# Patient Record
Sex: FEMALE | Race: WHITE | NOT HISPANIC OR LATINO | URBAN - METROPOLITAN AREA
[De-identification: names, ages, dates, MRNs, and addresses within clinical notes are randomized per-mention and may not be internally consistent; named-entity substitution may affect disease eponyms.]

---

## 2017-01-24 ENCOUNTER — OUTPATIENT (OUTPATIENT)
Dept: OUTPATIENT SERVICES | Facility: HOSPITAL | Age: 57
LOS: 1 days | End: 2017-01-24

## 2017-01-25 ENCOUNTER — OUTPATIENT (OUTPATIENT)
Dept: OUTPATIENT SERVICES | Facility: HOSPITAL | Age: 57
LOS: 1 days | End: 2017-01-25

## 2020-07-06 ENCOUNTER — APPOINTMENT (OUTPATIENT)
Dept: DISASTER EMERGENCY | Facility: CLINIC | Age: 60
End: 2020-07-06

## 2020-07-06 DIAGNOSIS — Z01.818 ENCOUNTER FOR OTHER PREPROCEDURAL EXAMINATION: ICD-10-CM

## 2020-07-06 PROBLEM — Z00.00 ENCOUNTER FOR PREVENTIVE HEALTH EXAMINATION: Status: ACTIVE | Noted: 2020-07-06

## 2020-07-07 LAB — SARS-COV-2 N GENE NPH QL NAA+PROBE: NOT DETECTED

## 2021-04-30 ENCOUNTER — OUTPATIENT (OUTPATIENT)
Dept: OUTPATIENT SERVICES | Facility: HOSPITAL | Age: 61
LOS: 1 days | End: 2021-04-30
Payer: COMMERCIAL

## 2021-04-30 PROCEDURE — 93010 ELECTROCARDIOGRAM REPORT: CPT

## 2021-05-11 ENCOUNTER — APPOINTMENT (OUTPATIENT)
Dept: DISASTER EMERGENCY | Facility: CLINIC | Age: 61
End: 2021-05-11

## 2021-05-12 LAB — SARS-COV-2 N GENE NPH QL NAA+PROBE: NOT DETECTED

## 2021-05-14 ENCOUNTER — OUTPATIENT (OUTPATIENT)
Dept: OUTPATIENT SERVICES | Facility: HOSPITAL | Age: 61
LOS: 1 days | End: 2021-05-14

## 2023-07-17 ENCOUNTER — OFFICE (OUTPATIENT)
Dept: URBAN - METROPOLITAN AREA CLINIC 38 | Facility: CLINIC | Age: 63
Setting detail: OPHTHALMOLOGY
End: 2023-07-17
Payer: COMMERCIAL

## 2023-07-17 DIAGNOSIS — H25.13: ICD-10-CM

## 2023-07-17 DIAGNOSIS — H35.413: ICD-10-CM

## 2023-07-17 DIAGNOSIS — H43.813: ICD-10-CM

## 2023-07-17 DIAGNOSIS — H02.831: ICD-10-CM

## 2023-07-17 DIAGNOSIS — H52.4: ICD-10-CM

## 2023-07-17 DIAGNOSIS — H02.834: ICD-10-CM

## 2023-07-17 DIAGNOSIS — Z79.899: ICD-10-CM

## 2023-07-17 PROBLEM — H52.7 REFRACTIVE ERROR: Status: ACTIVE | Noted: 2023-07-17

## 2023-07-17 PROCEDURE — 92083 EXTENDED VISUAL FIELD XM: CPT

## 2023-07-17 PROCEDURE — 92134 CPTRZ OPH DX IMG PST SGM RTA: CPT

## 2023-07-17 PROCEDURE — 92014 COMPRE OPH EXAM EST PT 1/>: CPT

## 2023-07-17 PROCEDURE — 92015 DETERMINE REFRACTIVE STATE: CPT

## 2023-07-17 ASSESSMENT — CONFRONTATIONAL VISUAL FIELD TEST (CVF)
OS_FINDINGS: FULL
OD_FINDINGS: FULL

## 2023-07-17 ASSESSMENT — REFRACTION_AUTOREFRACTION
OD_AXIS: 057
OD_CYLINDER: -1.00
OS_AXIS: 140
OS_CYLINDER: -0.25
OD_SPHERE: +1.00
OS_SPHERE: +0.75

## 2023-07-17 ASSESSMENT — REFRACTION_MANIFEST
OS_VA2: 20/20(J1+)
OS_VA1: 20/25+
OD_AXIS: 060
OD_VA1: 20/20
OD_VA1: 20/20
OD_ADD: +2.50
OD_ADD: +2.50
OS_AXIS: 140
OD_VA2: 20/20(J1+)
OS_VA1: 20/25+
OS_VA2: 20/20(J1+)
OS_ADD: +2.50
OS_AXIS: 140
OU_VA: 20/20
OS_CYLINDER: -0.75
OD_SPHERE: +0.75
OS_SPHERE: +0.25
OD_VA2: 20/20(J1+)
OS_SPHERE: +0.25
OU_VA: 20/20
OS_CYLINDER: -0.75
OD_SPHERE: +0.75
OD_CYLINDER: -0.50
OD_CYLINDER: -0.50
OD_AXIS: 060
OS_ADD: +2.50

## 2023-07-17 ASSESSMENT — KERATOMETRY
OD_AXISANGLE_DEGREES: 145
OS_K2POWER_DIOPTERS: 42.25
OS_AXISANGLE_DEGREES: 075
OD_K2POWER_DIOPTERS: 41.75
OS_K1POWER_DIOPTERS: 41.50
OD_K1POWER_DIOPTERS: 41.00
METHOD_AUTO_MANUAL: AUTO

## 2023-07-17 ASSESSMENT — REFRACTION_CURRENTRX
OD_OVR_VA: 20/
OD_VPRISM_DIRECTION: SV
OS_VPRISM_DIRECTION: SV
OS_SPHERE: +2.75
OD_OVR_VA: 20/
OD_VPRISM_DIRECTION: SV
OD_SPHERE: +0.75
OD_CYLINDER: -0.50
OD_AXIS: 059
OD_SPHERE: +3.00
OD_CYLINDER: -0.50
OS_SPHERE: +0.50
OS_OVR_VA: 20/
OD_AXIS: 063
OS_CYLINDER: SPH
OS_OVR_VA: 20/
OS_CYLINDER: SPHERE
OS_VPRISM_DIRECTION: SV

## 2023-07-17 ASSESSMENT — TONOMETRY
OS_IOP_MMHG: 20
OD_IOP_MMHG: 17

## 2023-07-17 ASSESSMENT — SPHEQUIV_DERIVED
OS_SPHEQUIV: -0.125
OS_SPHEQUIV: 0.625
OD_SPHEQUIV: 0.5
OS_SPHEQUIV: -0.125
OD_SPHEQUIV: 0.5
OD_SPHEQUIV: 0.5

## 2023-07-17 ASSESSMENT — AXIALLENGTH_DERIVED
OD_AL: 24.1925
OD_AL: 24.1925
OS_AL: 24.2556
OS_AL: 24.2556
OD_AL: 24.1925
OS_AL: 23.9507

## 2023-07-17 ASSESSMENT — VISUAL ACUITY
OD_BCVA: 20/25
OS_BCVA: 20/25+3

## 2023-07-17 ASSESSMENT — LID POSITION - DERMATOCHALASIS
OS_DERMATOCHALASIS: LUL 2+
OD_DERMATOCHALASIS: RUL 2+

## 2023-10-17 PROBLEM — Z87.19 HISTORY OF GASTROESOPHAGEAL REFLUX (GERD): Status: RESOLVED | Noted: 2023-10-17 | Resolved: 2023-10-17

## 2023-10-17 PROBLEM — Z87.19 HISTORY OF HEMORRHOIDS: Status: RESOLVED | Noted: 2023-10-17 | Resolved: 2023-10-17

## 2023-10-17 PROBLEM — Z87.09 HISTORY OF ASTHMA: Status: RESOLVED | Noted: 2023-10-17 | Resolved: 2023-10-17

## 2023-10-17 PROBLEM — Z87.39 HISTORY OF OSTEOPENIA: Status: RESOLVED | Noted: 2023-10-17 | Resolved: 2023-10-17

## 2023-10-17 PROBLEM — G43.909 MIGRAINES: Status: RESOLVED | Noted: 2023-10-17 | Resolved: 2023-10-17

## 2023-10-17 PROBLEM — Z86.2 HISTORY OF ANEMIA: Status: RESOLVED | Noted: 2023-10-17 | Resolved: 2023-10-17

## 2023-10-17 RX ORDER — FAMOTIDINE 40 MG/1
40 TABLET, FILM COATED ORAL
Refills: 0 | Status: ACTIVE | COMMUNITY

## 2023-10-17 RX ORDER — PANTOPRAZOLE 40 MG/1
40 TABLET, DELAYED RELEASE ORAL
Refills: 0 | Status: ACTIVE | COMMUNITY

## 2023-10-17 RX ORDER — CYANOCOBALAMIN 1000 UG/ML
1000 INJECTION INTRAMUSCULAR; SUBCUTANEOUS
Refills: 0 | Status: ACTIVE | COMMUNITY

## 2023-10-17 RX ORDER — HYDROXYZINE HYDROCHLORIDE 25 MG/1
25 TABLET ORAL
Refills: 0 | Status: ACTIVE | COMMUNITY

## 2023-10-17 RX ORDER — CHROMIUM 200 MCG
800 TABLET ORAL
Refills: 0 | Status: ACTIVE | COMMUNITY

## 2023-10-20 ENCOUNTER — APPOINTMENT (OUTPATIENT)
Dept: THORACIC SURGERY | Facility: CLINIC | Age: 63
End: 2023-10-20
Payer: MEDICARE

## 2023-10-20 VITALS
WEIGHT: 205 LBS | OXYGEN SATURATION: 97 % | HEIGHT: 64 IN | SYSTOLIC BLOOD PRESSURE: 142 MMHG | BODY MASS INDEX: 35 KG/M2 | DIASTOLIC BLOOD PRESSURE: 90 MMHG | HEART RATE: 96 BPM | TEMPERATURE: 98 F

## 2023-10-20 DIAGNOSIS — Z87.09 PERSONAL HISTORY OF OTHER DISEASES OF THE RESPIRATORY SYSTEM: ICD-10-CM

## 2023-10-20 DIAGNOSIS — G43.909 MIGRAINE, UNSPECIFIED, NOT INTRACTABLE, W/OUT STATUS MIGRAINOSUS: ICD-10-CM

## 2023-10-20 DIAGNOSIS — Z86.2 PERSONAL HISTORY OF DISEASES OF THE BLOOD AND BLOOD-FORMING ORGANS AND CERTAIN DISORDERS INVOLVING THE IMMUNE MECHANISM: ICD-10-CM

## 2023-10-20 DIAGNOSIS — Z87.39 PERSONAL HISTORY OF OTHER DISEASES OF THE MUSCULOSKELETAL SYSTEM AND CONNECTIVE TISSUE: ICD-10-CM

## 2023-10-20 DIAGNOSIS — Z87.19 PERSONAL HISTORY OF OTHER DISEASES OF THE DIGESTIVE SYSTEM: ICD-10-CM

## 2023-10-20 PROCEDURE — 99202 OFFICE O/P NEW SF 15 MIN: CPT

## 2024-01-17 ENCOUNTER — APPOINTMENT (OUTPATIENT)
Dept: ORTHOPEDIC SURGERY | Facility: CLINIC | Age: 64
End: 2024-01-17
Payer: MEDICARE

## 2024-01-17 VITALS — HEIGHT: 65 IN | WEIGHT: 194 LBS | BODY MASS INDEX: 32.32 KG/M2

## 2024-01-17 PROCEDURE — 99204 OFFICE O/P NEW MOD 45 MIN: CPT

## 2024-01-17 RX ORDER — NAPROXEN 500 MG/1
500 TABLET ORAL
Refills: 0 | Status: ACTIVE | COMMUNITY

## 2024-01-17 RX ORDER — FLUTICASONE PROPIONATE 50 MCG
50 SPRAY, SUSPENSION NASAL
Refills: 0 | Status: ACTIVE | COMMUNITY

## 2024-01-17 NOTE — PHYSICAL EXAM
[TextEntry] : Constitutional: Well groomed and developed.  Respiratory: Normal, unlabored breathing. No use of accessory muscles.  Skin: No rashes or ulcers. Skin warm and dry.  Psychiatric: Oriented to time, place, person and event. No acute distress. Gait: Heel to toe Patient able to walk on toes and heels.    Lumbar spine:  Posture: Normal on coronal and sagittal alignment  AROM:  Flexion 70 Extension 10  Moderate pain with truncal rotation  Tenderness:  Thoracic: Moderate tenderness on palpation     TTP over T8 and T11 Lumbar: No tenderness on palpation  Sacrum/coccyx: no tenderness on palpation  Greater trochanteric bursa:  No tenderness  PSIS: None    Motor:                         R             L LE:                                IS                    5             5 Quad              5             5 TA                  5             5 EHL                5             5 Gastroc         5             5                 R  L DTR: Patella  2+  2+ Achilles  2+  2+  Sensory: Light touch sensation intact T12-S1  Babinski's Sign: Negative bilaterally  Straight leg raise test: Negative bilaterally  NEGIN test: negative bilaterally

## 2024-01-17 NOTE — IMAGING
[Outside films reviewed] : Outside films reviewed [Fracture] : Fracture [FreeTextEntry1] : Fracture T8, T11

## 2024-01-17 NOTE — ASSESSMENT
[FreeTextEntry1] : 64 yo female presents today for evaluation of her mid back pain. X-rays from  reveal compression fracture T8, T11, likely acute. Discussed with patient that MRI is indicated to further evaluate for any retropulsion.   - Patient given prescription for MRI, follow up after study is completed to discuss results.   - Recommend NSAIDs PRN - Recommend heating pad use to decrease muscle spasm - Discussed the importance of home exercises, including but not limited to hamstring stretching and core strengthening   Patient was educated on their diagnosis today. All questions answered and patient expressed understanding.   F/U after MRI

## 2024-01-17 NOTE — HISTORY OF PRESENT ILLNESS
[Mid-back] : mid-back [Lower back] : lower back [Gradual] : gradual [5] : 5 [3] : 3 [Dull/Aching] : dull/aching [Sharp] : sharp [Constant] : constant [Standing] : standing [Walking] : walking [Exercising] : exercising [Retired] : Work status: retired [de-identified] : Patient presents today with mid and low back pain after slipping in the mud 11/2023- reports T spine fx.  Pt states pain increased over time- went to PCP in 1/2024 and had Xrays at P  Pt has hx of broken ribs on Right side 9/2023 Pt reports constant dull pain in mid back- worse with sharp pains in right side when laying down/ sleeping  Pt denies pain, numbness, or tingling in LEs but reports consistent numbness in great toe  Pt reports taking Naproxen 500mg and using cream and salon Pas per PCP with relief.  Pt denies previous treatments for her back in the past,  [] : no [FreeTextEntry3] : 11/2023- fell at home  [de-identified] : sleeping  [de-identified] : Xrays at  1/2024

## 2024-01-18 ENCOUNTER — RESULT REVIEW (OUTPATIENT)
Age: 64
End: 2024-01-18

## 2024-01-22 ENCOUNTER — APPOINTMENT (OUTPATIENT)
Dept: ORTHOPEDIC SURGERY | Facility: CLINIC | Age: 64
End: 2024-01-22
Payer: MEDICARE

## 2024-01-22 PROCEDURE — 99214 OFFICE O/P EST MOD 30 MIN: CPT

## 2024-01-22 NOTE — HISTORY OF PRESENT ILLNESS
[Lower back] : lower back [Mid-back] : mid-back [Gradual] : gradual [5] : 5 [3] : 3 [Dull/Aching] : dull/aching [Sharp] : sharp [Constant] : constant [Standing] : standing [Walking] : walking [Exercising] : exercising [Retired] : Work status: retired [de-identified] : Follow up thoracic spine MRI Results at . Patient states she has constant pain in her lower back.  Patient admits to taking Naproxen for pain. Patient admits to using Salonpas.  Today's Pain 3/10 [] : no [FreeTextEntry3] : 11/2023- fell at home  [de-identified] : sleeping  [de-identified] : Xrays at  1/2024

## 2024-01-22 NOTE — DATA REVIEWED
[MRI] : MRI [Thoracic Spine] : thoracic spine [I independently reviewed and interpreted images and report] : I independently reviewed and interpreted images and report [FreeTextEntry1] : 1/2024 MRI of L-Spine was reviewed today and is as follows:  Acute compression deformity T8, T11 No retropulsion

## 2024-01-22 NOTE — ASSESSMENT
[FreeTextEntry1] : 1/2024 MRI of L-Spine was reviewed today and is as follows:  Acute compression deformity T8, T11 No retropulsion  62 yo female presents today for evaluation of her mid back pain. MRI detailed above shows acute compression deformity without retropulsion. Patient TTP over fractures but states her pain is manageable. Recommend bracing, activity modification. Discussed with patient that fracture generally heals in 6-8 weeks on its own.   - Patient given rx for TLSO brace to provide stability and facilitate healing following injury to the thoracic/ lumbar spine.   - Emphasized importance of proper lifting mechanics  - Recommend NSAIDs PRN - Recommend heating pad use to decrease muscle spasm - Discussed the importance of home exercises, including but not limited to hamstring stretching and core strengthening   Patient was educated on their diagnosis today. All questions answered and patient expressed understanding.   Follow up in 6 weeks for repeat x-ray

## 2024-02-02 RX ORDER — METHYLPREDNISOLONE 4 MG/1
4 TABLET ORAL
Qty: 1 | Refills: 0 | Status: ACTIVE | COMMUNITY
Start: 2024-02-02 | End: 1900-01-01

## 2024-03-04 ENCOUNTER — APPOINTMENT (OUTPATIENT)
Dept: ORTHOPEDIC SURGERY | Facility: CLINIC | Age: 64
End: 2024-03-04
Payer: MEDICARE

## 2024-03-04 PROCEDURE — 99214 OFFICE O/P EST MOD 30 MIN: CPT

## 2024-03-04 PROCEDURE — 72070 X-RAY EXAM THORAC SPINE 2VWS: CPT

## 2024-03-04 NOTE — PHYSICAL EXAM
[TextEntry] : Constitutional: Well groomed and developed.  Respiratory: Normal, unlabored breathing. No use of accessory muscles.  Skin: No rashes or ulcers. Skin warm and dry.  Psychiatric: Oriented to time, place, person and event. No acute distress. Gait: Heel to toe Patient able to walk on toes and heels.    Lumbar spine:  Posture: Normal on coronal and sagittal alignment  AROM:  Flexion 70 Extension 10  Moderate pain with truncal rotation  Tenderness:  Thoracic: Moderate tenderness on palpation     TTP over T8, mild TTP over T11    TTP thoracic paraspinal muscles Lumbar: No tenderness on palpation  Sacrum/coccyx: no tenderness on palpation  Greater trochanteric bursa:  No tenderness  PSIS: None    Motor:                         R             L LE:                                IS                    5             5 Quad              5             5 TA                  5             5 EHL                5             5 Gastroc         5             5                 R  L DTR: Patella  2+  2+ Achilles  2+  2+  Sensory: Light touch sensation intact T12-S1  Babinski's Sign: Negative bilaterally  Straight leg raise test: Negative bilaterally  NEGIN test: negative bilaterally

## 2024-03-04 NOTE — HISTORY OF PRESENT ILLNESS
[Mid-back] : mid-back [Lower back] : lower back [Gradual] : gradual [5] : 5 [3] : 3 [Dull/Aching] : dull/aching [Sharp] : sharp [Constant] : constant [Standing] : standing [Walking] : walking [Exercising] : exercising [Retired] : Work status: retired [de-identified] : Follow up thoracic spine compression fracture T8 and T11. Patient states she has pain in her middle back when lying down. Patient states she still has constant pain in her lower back. Patient admits to taking Gabapentin and Naproxen for pain. Patient admits to wearing TLSO brace with prolonged standing.  Today's Pain 4/10, lying down 9/10 [] : no [FreeTextEntry3] : 11/2023- fell at home  [de-identified] : sleeping  [de-identified] : Xrays at  1/2024

## 2024-03-04 NOTE — ASSESSMENT
[FreeTextEntry1] : 1/2024 MRI of L-Spine was reviewed today and is as follows:  Acute compression deformity T8, T11 No retropulsion  62 yo female presents today for evaluation of her mid back pain. X-rays today show compression fractures stable. However, patient still with TTP primarily over T8 despite 2 months of TLSO bracing. Discussed options with patient including kyphoplasty and bracing. Patient has elected to proceed with 1 more month of bracing.  - Continue TLSO brace to provide stability and facilitate healing following injury to the thoracic/ lumbar spine.   - Emphasized importance of proper lifting mechanics  - Recommend NSAIDs PRN - Recommend heating pad use to decrease muscle spasm - Discussed the importance of home exercises, including but not limited to hamstring stretching and core strengthening   Patient was educated on their diagnosis today. All questions answered and patient expressed understanding.   Follow up in 2 months

## 2024-05-06 ENCOUNTER — APPOINTMENT (OUTPATIENT)
Dept: ORTHOPEDIC SURGERY | Facility: CLINIC | Age: 64
End: 2024-05-06
Payer: MEDICARE

## 2024-05-06 DIAGNOSIS — S22.080A WEDGE COMPRESSION FRACTURE OF T11-T12 VERTEBRA, INITIAL ENCOUNTER FOR CLOSED FRACTURE: ICD-10-CM

## 2024-05-06 DIAGNOSIS — S22.060A WEDGE COMPRESSION FRACTURE OF T7-T8 VERTEBRA, INITIAL ENCOUNTER FOR CLOSED FRACTURE: ICD-10-CM

## 2024-05-06 PROCEDURE — 99214 OFFICE O/P EST MOD 30 MIN: CPT

## 2024-05-06 NOTE — HISTORY OF PRESENT ILLNESS
[de-identified] : Follow up thoracic spine compression fracture T8 and T11 from 11/2023. Patient states she has pain wrapping into her ribcage. Patient admits to taking Gabapentin. Patient admits to wearing TLSO brace with prolonged standing.  Today's Pain 1/10

## 2024-05-06 NOTE — PHYSICAL EXAM
[TextEntry] : Constitutional: Well groomed and developed.  Respiratory: Normal, unlabored breathing. No use of accessory muscles.  Skin: No rashes or ulcers. Skin warm and dry.  Psychiatric: Oriented to time, place, person and event. No acute distress. Gait: Heel to toe Patient able to walk on toes and heels.    Lumbar spine:  Posture: Normal on coronal and sagittal alignment  AROM:  Flexion 90 Extension 10  Mild pain with truncal rotation  Tenderness:  Thoracic: Mild tenderness on palpation  Lumbar: No tenderness on palpation  Sacrum/coccyx: no tenderness on palpation  Greater trochanteric bursa:  No tenderness  PSIS: None    Motor:                         R             L LE:                                IS                    5             5 Quad              5             5 TA                  5             5 EHL                5             5 Gastroc         5             5                 R  L DTR: Patella  2+  2+ Achilles  2+  2+  Sensory: Light touch sensation intact T12-S1  Babinski's Sign: Negative bilaterally  Straight leg raise test: Negative bilaterally  NEGIN test: negative bilaterally

## 2024-05-06 NOTE — ASSESSMENT
[FreeTextEntry1] : 1/2024 MRI of L-Spine was reviewed today and is as follows:  Acute compression deformity T8, T11 No retropulsion  64 yo female presents today for evaluation of her mid back pain. Patient with some residual pain and muscle spasm following compression fractures in November. However, pain is improved and no TTP over fracture sites. I recommend proceeding with PT for relief.   - Recommend physical therapy to regain range of motion, strengthening and symptomatic improvement. Prescription given in office today.   - Recommend NSAIDs PRN - Recommend heating pad use to decrease muscle spasm - Discussed the importance of home exercises, including but not limited to hamstring stretching and core strengthening   Patient was educated on their diagnosis today. All questions answered and patient expressed understanding.   Follow up in 2 months

## 2024-12-18 PROBLEM — H11.153 PINGUECULA; BOTH EYES: Status: ACTIVE | Noted: 2024-12-18

## 2025-01-28 ENCOUNTER — RESULT REVIEW (OUTPATIENT)
Age: 65
End: 2025-01-28

## 2025-02-06 ENCOUNTER — OFFICE (OUTPATIENT)
Dept: URBAN - METROPOLITAN AREA CLINIC 38 | Facility: CLINIC | Age: 65
Setting detail: OPHTHALMOLOGY
End: 2025-02-06
Payer: MEDICARE

## 2025-02-06 DIAGNOSIS — H52.4: ICD-10-CM

## 2025-02-06 DIAGNOSIS — H25.13: ICD-10-CM

## 2025-02-06 DIAGNOSIS — H43.813: ICD-10-CM

## 2025-02-06 DIAGNOSIS — H35.413: ICD-10-CM

## 2025-02-06 DIAGNOSIS — H02.831: ICD-10-CM

## 2025-02-06 PROCEDURE — 92015 DETERMINE REFRACTIVE STATE: CPT | Performed by: OPTOMETRIST

## 2025-02-06 PROCEDURE — 92014 COMPRE OPH EXAM EST PT 1/>: CPT | Performed by: OPTOMETRIST

## 2025-02-06 ASSESSMENT — REFRACTION_MANIFEST
OD_CYLINDER: -0.50
OD_CYLINDER: -0.50
OS_VA1: 20/30
OD_VA1: 20/30+
OS_SPHERE: +0.50
OD_VA1: 20/30+
OD_AXIS: 055
OD_ADD: +2.50
OD_AXIS: 055
OD_SPHERE: +0.75
OS_CYLINDER: -0.75
OS_CYLINDER: -0.50
OS_VA1: 20/30
OD_SPHERE: +0.50
OS_AXIS: 155
OS_ADD: +2.50
OS_SPHERE: +0.75
OS_AXIS: 145

## 2025-02-06 ASSESSMENT — REFRACTION_CURRENTRX
OD_AXIS: 059
OD_SPHERE: +0.75
OD_OVR_VA: 20/
OD_VPRISM_DIRECTION: SV
OS_VPRISM_DIRECTION: SV
OS_OVR_VA: 20/
OD_CYLINDER: -0.50
OS_OVR_VA: 20/
OD_OVR_VA: 20/
OS_SPHERE: +0.50
OD_SPHERE: +3.00
OD_AXIS: 063
OS_SPHERE: +2.75
OS_CYLINDER: SPH
OD_CYLINDER: -0.50
OS_CYLINDER: SPHERE
OD_VPRISM_DIRECTION: SV
OS_VPRISM_DIRECTION: SV

## 2025-02-06 ASSESSMENT — KERATOMETRY
OD_K2POWER_DIOPTERS: 41.75
OS_AXISANGLE_DEGREES: 064
OS_K1POWER_DIOPTERS: 41.50
METHOD_AUTO_MANUAL: AUTO
OD_AXISANGLE_DEGREES: 138
OD_K1POWER_DIOPTERS: 41.00
OS_K2POWER_DIOPTERS: 41.75

## 2025-02-06 ASSESSMENT — REFRACTION_AUTOREFRACTION
OD_SPHERE: +0.75
OS_SPHERE: +1.00
OS_CYLINDER: -0.50
OS_AXIS: 139
OD_CYLINDER: -0.75
OD_AXIS: 058

## 2025-02-06 ASSESSMENT — LID POSITION - DERMATOCHALASIS
OD_DERMATOCHALASIS: RUL 2+
OS_DERMATOCHALASIS: LUL 2+

## 2025-02-06 ASSESSMENT — CONFRONTATIONAL VISUAL FIELD TEST (CVF)
OD_FINDINGS: FULL
OS_FINDINGS: FULL

## 2025-02-06 ASSESSMENT — VISUAL ACUITY
OD_BCVA: 20/25
OS_BCVA: 20/25

## 2025-02-27 PROBLEM — H11.153 PINGUECULA; BOTH EYES: Status: ACTIVE | Noted: 2025-02-07

## 2025-03-17 ENCOUNTER — APPOINTMENT (OUTPATIENT)
Dept: ORTHOPEDIC SURGERY | Facility: CLINIC | Age: 65
End: 2025-03-17
Payer: MEDICARE

## 2025-03-17 DIAGNOSIS — S32.010A WEDGE COMPRESSION FRACTURE OF FIRST LUMBAR VERTEBRA, INITIAL ENCOUNTER FOR CLOSED FRACTURE: ICD-10-CM

## 2025-03-17 DIAGNOSIS — S22.080A WEDGE COMPRESSION FRACTURE OF T11-T12 VERTEBRA, INITIAL ENCOUNTER FOR CLOSED FRACTURE: ICD-10-CM

## 2025-03-17 PROCEDURE — 99214 OFFICE O/P EST MOD 30 MIN: CPT

## 2025-03-17 PROCEDURE — 72100 X-RAY EXAM L-S SPINE 2/3 VWS: CPT

## 2025-03-17 PROCEDURE — 72074 X-RAY EXAM THORAC SPINE4/>VW: CPT

## 2025-03-17 RX ORDER — LEVOCETIRIZINE DIHYDROCHLORIDE 5 MG/1
5 TABLET ORAL
Refills: 0 | Status: ACTIVE | COMMUNITY

## 2025-03-24 ENCOUNTER — RESULT REVIEW (OUTPATIENT)
Age: 65
End: 2025-03-24

## 2025-04-28 ENCOUNTER — APPOINTMENT (OUTPATIENT)
Dept: ORTHOPEDIC SURGERY | Facility: CLINIC | Age: 65
End: 2025-04-28
Payer: MEDICARE

## 2025-04-28 DIAGNOSIS — M48.061 SPINAL STENOSIS, LUMBAR REGION WITHOUT NEUROGENIC CLAUDICATION: ICD-10-CM

## 2025-04-28 DIAGNOSIS — S22.080A WEDGE COMPRESSION FRACTURE OF T11-T12 VERTEBRA, INITIAL ENCOUNTER FOR CLOSED FRACTURE: ICD-10-CM

## 2025-04-28 DIAGNOSIS — S22.060A WEDGE COMPRESSION FRACTURE OF T7-T8 VERTEBRA, INITIAL ENCOUNTER FOR CLOSED FRACTURE: ICD-10-CM

## 2025-04-28 DIAGNOSIS — S32.010A WEDGE COMPRESSION FRACTURE OF FIRST LUMBAR VERTEBRA, INITIAL ENCOUNTER FOR CLOSED FRACTURE: ICD-10-CM

## 2025-04-28 PROCEDURE — 99214 OFFICE O/P EST MOD 30 MIN: CPT

## 2025-04-29 ENCOUNTER — OFFICE (OUTPATIENT)
Dept: URBAN - METROPOLITAN AREA CLINIC 88 | Facility: CLINIC | Age: 65
Setting detail: OPHTHALMOLOGY
End: 2025-04-29
Payer: MEDICARE

## 2025-04-29 ENCOUNTER — OFFICE (OUTPATIENT)
Dept: URBAN - METROPOLITAN AREA CLINIC 105 | Facility: CLINIC | Age: 65
Setting detail: OPHTHALMOLOGY
End: 2025-04-29
Payer: MEDICARE

## 2025-04-29 DIAGNOSIS — H35.413: ICD-10-CM

## 2025-04-29 DIAGNOSIS — H33.8: ICD-10-CM

## 2025-04-29 DIAGNOSIS — H33.311: ICD-10-CM

## 2025-04-29 DIAGNOSIS — H43.813: ICD-10-CM

## 2025-04-29 PROCEDURE — N/C NO CHARGE: Performed by: OPTOMETRIST

## 2025-04-29 PROCEDURE — 99215 OFFICE O/P EST HI 40 MIN: CPT | Performed by: OPHTHALMOLOGY

## 2025-04-29 PROCEDURE — 92134 CPTRZ OPH DX IMG PST SGM RTA: CPT | Performed by: OPHTHALMOLOGY

## 2025-04-29 PROCEDURE — 92201 OPSCPY EXTND RTA DRAW UNI/BI: CPT | Performed by: OPHTHALMOLOGY

## 2025-04-29 ASSESSMENT — REFRACTION_AUTOREFRACTION
OS_CYLINDER: -0.50
OS_AXIS: 139
OD_CYLINDER: -0.75
OD_SPHERE: +0.75
OS_SPHERE: +1.00
OS_AXIS: 139
OS_CYLINDER: -0.50
OD_SPHERE: +0.75
OS_SPHERE: +1.00
OD_AXIS: 058
OD_CYLINDER: -0.75
OD_AXIS: 058

## 2025-04-29 ASSESSMENT — REFRACTION_MANIFEST
OD_ADD: +2.50
OS_SPHERE: +0.75
OD_CYLINDER: -0.50
OS_AXIS: 145
OS_ADD: +2.50
OD_VA1: 20/30+
OD_SPHERE: +0.75
OS_CYLINDER: -0.50
OD_CYLINDER: -0.50
OD_VA1: 20/30+
OS_VA1: 20/30
OS_CYLINDER: -0.75
OS_AXIS: 155
OS_VA1: 20/30
OS_SPHERE: +0.50
OD_AXIS: 055
OD_SPHERE: +0.50
OD_AXIS: 055

## 2025-04-29 ASSESSMENT — CONFRONTATIONAL VISUAL FIELD TEST (CVF)
OS_FINDINGS: FULL
OD_FINDINGS: FULL
OS_FINDINGS: FULL
OD_FINDINGS: FULL

## 2025-04-29 ASSESSMENT — REFRACTION_CURRENTRX
OD_OVR_VA: 20/
OD_CYLINDER: -0.50
OS_CYLINDER: SPH
OD_CYLINDER: -0.50
OD_AXIS: 059
OS_VPRISM_DIRECTION: SV
OD_VPRISM_DIRECTION: SV
OS_SPHERE: +0.50
OS_CYLINDER: SPHERE
OS_OVR_VA: 20/
OS_OVR_VA: 20/
OS_VPRISM_DIRECTION: SV
OD_SPHERE: +3.00
OD_VPRISM_DIRECTION: SV
OS_SPHERE: +2.75
OD_AXIS: 063
OD_OVR_VA: 20/
OD_SPHERE: +0.75

## 2025-04-29 ASSESSMENT — KERATOMETRY
METHOD_AUTO_MANUAL: AUTO
OD_AXISANGLE_DEGREES: 138
METHOD_AUTO_MANUAL: AUTO
OS_AXISANGLE_DEGREES: 064
OS_K2POWER_DIOPTERS: 41.75
OD_K2POWER_DIOPTERS: 41.75
OS_K1POWER_DIOPTERS: 41.50
OS_K2POWER_DIOPTERS: 41.75
OD_K1POWER_DIOPTERS: 41.00
OD_K1POWER_DIOPTERS: 41.00
OS_K1POWER_DIOPTERS: 41.50
OD_K2POWER_DIOPTERS: 41.75
OS_AXISANGLE_DEGREES: 064
OD_AXISANGLE_DEGREES: 138

## 2025-04-29 ASSESSMENT — VISUAL ACUITY
OS_BCVA: 20/25-2
OD_BCVA: 20/25-1
OD_BCVA: 20/25-1
OS_BCVA: 20/25-2

## 2025-04-29 ASSESSMENT — TONOMETRY
OS_IOP_MMHG: 19
OD_IOP_MMHG: 16
OS_IOP_MMHG: 19
OD_IOP_MMHG: 16

## 2025-04-29 ASSESSMENT — LID POSITION - DERMATOCHALASIS
OS_DERMATOCHALASIS: LUL 2+
OD_DERMATOCHALASIS: RUL 2+
OD_DERMATOCHALASIS: RUL 2+
OS_DERMATOCHALASIS: LUL 2+

## 2025-04-30 ENCOUNTER — OUTPATIENT (OUTPATIENT)
Dept: EMERGENCY DEPT | Facility: HOSPITAL | Age: 65
LOS: 1 days | End: 2025-04-30
Payer: MEDICARE

## 2025-04-30 ENCOUNTER — OUTPATIENT HOSPITAL (OUTPATIENT)
Dept: URBAN - METROPOLITAN AREA CLINIC 33 | Facility: CLINIC | Age: 65
Setting detail: OPHTHALMOLOGY
End: 2025-04-30
Payer: MEDICARE

## 2025-04-30 ENCOUNTER — TRANSCRIPTION ENCOUNTER (OUTPATIENT)
Age: 65
End: 2025-04-30

## 2025-04-30 VITALS
OXYGEN SATURATION: 97 % | WEIGHT: 179.9 LBS | DIASTOLIC BLOOD PRESSURE: 78 MMHG | RESPIRATION RATE: 15 BRPM | SYSTOLIC BLOOD PRESSURE: 112 MMHG | TEMPERATURE: 98 F | HEIGHT: 63 IN | HEART RATE: 79 BPM

## 2025-04-30 VITALS
DIASTOLIC BLOOD PRESSURE: 69 MMHG | RESPIRATION RATE: 18 BRPM | HEART RATE: 71 BPM | SYSTOLIC BLOOD PRESSURE: 106 MMHG | OXYGEN SATURATION: 95 %

## 2025-04-30 DIAGNOSIS — H33.20 SEROUS RETINAL DETACHMENT, UNSPECIFIED EYE: ICD-10-CM

## 2025-04-30 DIAGNOSIS — Z90.89 ACQUIRED ABSENCE OF OTHER ORGANS: Chronic | ICD-10-CM

## 2025-04-30 DIAGNOSIS — Z98.890 OTHER SPECIFIED POSTPROCEDURAL STATES: Chronic | ICD-10-CM

## 2025-04-30 DIAGNOSIS — H33.8: ICD-10-CM

## 2025-04-30 DIAGNOSIS — Z90.711 ACQUIRED ABSENCE OF UTERUS WITH REMAINING CERVICAL STUMP: Chronic | ICD-10-CM

## 2025-04-30 DIAGNOSIS — Z98.891 HISTORY OF UTERINE SCAR FROM PREVIOUS SURGERY: Chronic | ICD-10-CM

## 2025-04-30 DIAGNOSIS — Z87.828 PERSONAL HISTORY OF OTHER (HEALED) PHYSICAL INJURY AND TRAUMA: Chronic | ICD-10-CM

## 2025-04-30 LAB
ALBUMIN SERPL ELPH-MCNC: 3.8 G/DL — SIGNIFICANT CHANGE UP (ref 3.3–5)
ALP SERPL-CCNC: 134 U/L — HIGH (ref 30–120)
ALT FLD-CCNC: 26 U/L — SIGNIFICANT CHANGE UP (ref 10–60)
ANION GAP SERPL CALC-SCNC: 3 MMOL/L — LOW (ref 5–17)
APTT BLD: 29 SEC — SIGNIFICANT CHANGE UP (ref 26.1–36.8)
AST SERPL-CCNC: 22 U/L — SIGNIFICANT CHANGE UP (ref 10–40)
BASOPHILS # BLD AUTO: 0.06 K/UL — SIGNIFICANT CHANGE UP (ref 0–0.2)
BASOPHILS NFR BLD AUTO: 1.2 % — SIGNIFICANT CHANGE UP (ref 0–2)
BILIRUB SERPL-MCNC: 0.5 MG/DL — SIGNIFICANT CHANGE UP (ref 0.2–1.2)
BLD GP AB SCN SERPL QL: SIGNIFICANT CHANGE UP
BUN SERPL-MCNC: 12 MG/DL — SIGNIFICANT CHANGE UP (ref 7–23)
CALCIUM SERPL-MCNC: 9.1 MG/DL — SIGNIFICANT CHANGE UP (ref 8.4–10.5)
CHLORIDE SERPL-SCNC: 105 MMOL/L — SIGNIFICANT CHANGE UP (ref 96–108)
CO2 SERPL-SCNC: 32 MMOL/L — HIGH (ref 22–31)
CREAT SERPL-MCNC: 0.71 MG/DL — SIGNIFICANT CHANGE UP (ref 0.5–1.3)
EGFR: 94 ML/MIN/1.73M2 — SIGNIFICANT CHANGE UP
EGFR: 94 ML/MIN/1.73M2 — SIGNIFICANT CHANGE UP
EOSINOPHIL # BLD AUTO: 0.47 K/UL — SIGNIFICANT CHANGE UP (ref 0–0.5)
EOSINOPHIL NFR BLD AUTO: 9.2 % — HIGH (ref 0–6)
GLUCOSE SERPL-MCNC: 98 MG/DL — SIGNIFICANT CHANGE UP (ref 70–99)
HCT VFR BLD CALC: 37.3 % — SIGNIFICANT CHANGE UP (ref 34.5–45)
HGB BLD-MCNC: 12.5 G/DL — SIGNIFICANT CHANGE UP (ref 11.5–15.5)
IMM GRANULOCYTES NFR BLD AUTO: 0.2 % — SIGNIFICANT CHANGE UP (ref 0–0.9)
INR BLD: 1 RATIO — SIGNIFICANT CHANGE UP (ref 0.85–1.16)
LYMPHOCYTES # BLD AUTO: 2.06 K/UL — SIGNIFICANT CHANGE UP (ref 1–3.3)
LYMPHOCYTES # BLD AUTO: 40.2 % — SIGNIFICANT CHANGE UP (ref 13–44)
MCHC RBC-ENTMCNC: 30.6 PG — SIGNIFICANT CHANGE UP (ref 27–34)
MCHC RBC-ENTMCNC: 33.5 G/DL — SIGNIFICANT CHANGE UP (ref 32–36)
MCV RBC AUTO: 91.4 FL — SIGNIFICANT CHANGE UP (ref 80–100)
MONOCYTES # BLD AUTO: 0.55 K/UL — SIGNIFICANT CHANGE UP (ref 0–0.9)
MONOCYTES NFR BLD AUTO: 10.7 % — SIGNIFICANT CHANGE UP (ref 2–14)
NEUTROPHILS # BLD AUTO: 1.98 K/UL — SIGNIFICANT CHANGE UP (ref 1.8–7.4)
NEUTROPHILS NFR BLD AUTO: 38.5 % — LOW (ref 43–77)
NRBC BLD AUTO-RTO: 0 /100 WBCS — SIGNIFICANT CHANGE UP (ref 0–0)
PLATELET # BLD AUTO: 166 K/UL — SIGNIFICANT CHANGE UP (ref 150–400)
POTASSIUM SERPL-MCNC: 4.1 MMOL/L — SIGNIFICANT CHANGE UP (ref 3.5–5.3)
POTASSIUM SERPL-SCNC: 4.1 MMOL/L — SIGNIFICANT CHANGE UP (ref 3.5–5.3)
PROT SERPL-MCNC: 6.6 G/DL — SIGNIFICANT CHANGE UP (ref 6–8.3)
PROTHROM AB SERPL-ACNC: 11.8 SEC — SIGNIFICANT CHANGE UP (ref 9.9–13.4)
RBC # BLD: 4.08 M/UL — SIGNIFICANT CHANGE UP (ref 3.8–5.2)
RBC # FLD: 13.2 % — SIGNIFICANT CHANGE UP (ref 10.3–14.5)
SODIUM SERPL-SCNC: 140 MMOL/L — SIGNIFICANT CHANGE UP (ref 135–145)
WBC # BLD: 5.13 K/UL — SIGNIFICANT CHANGE UP (ref 3.8–10.5)
WBC # FLD AUTO: 5.13 K/UL — SIGNIFICANT CHANGE UP (ref 3.8–10.5)

## 2025-04-30 PROCEDURE — C1889: CPT

## 2025-04-30 PROCEDURE — 85025 COMPLETE CBC W/AUTO DIFF WBC: CPT

## 2025-04-30 PROCEDURE — 36415 COLL VENOUS BLD VENIPUNCTURE: CPT

## 2025-04-30 PROCEDURE — 86900 BLOOD TYPING SEROLOGIC ABO: CPT

## 2025-04-30 PROCEDURE — 93005 ELECTROCARDIOGRAM TRACING: CPT

## 2025-04-30 PROCEDURE — C1784: CPT

## 2025-04-30 PROCEDURE — 67108 REPAIR DETACHED RETINA: CPT | Mod: RT | Performed by: OPHTHALMOLOGY

## 2025-04-30 PROCEDURE — 86901 BLOOD TYPING SEROLOGIC RH(D): CPT

## 2025-04-30 PROCEDURE — 85610 PROTHROMBIN TIME: CPT

## 2025-04-30 PROCEDURE — 86850 RBC ANTIBODY SCREEN: CPT

## 2025-04-30 PROCEDURE — 85730 THROMBOPLASTIN TIME PARTIAL: CPT

## 2025-04-30 PROCEDURE — 80053 COMPREHEN METABOLIC PANEL: CPT

## 2025-04-30 PROCEDURE — 67108 REPAIR DETACHED RETINA: CPT | Mod: RT

## 2025-04-30 DEVICE — IMPLANTABLE DEVICE: Type: IMPLANTABLE DEVICE | Site: RIGHT | Status: FUNCTIONAL

## 2025-04-30 DEVICE — GS C3F8 PERFLUOROPROPANE IOL 2.5 L 20GM: Type: IMPLANTABLE DEVICE | Site: RIGHT | Status: FUNCTIONAL

## 2025-04-30 DEVICE — LASER PROBE 25G CONSTELLATION: Type: IMPLANTABLE DEVICE | Site: RIGHT | Status: FUNCTIONAL

## 2025-04-30 DEVICE — PERFLUORON 7ML KIT: Type: IMPLANTABLE DEVICE | Site: RIGHT | Status: FUNCTIONAL

## 2025-04-30 DEVICE — STRIP SILICONE 0.75X3.5X125MM: Type: IMPLANTABLE DEVICE | Site: RIGHT | Status: FUNCTIONAL

## 2025-04-30 NOTE — CONSULT NOTE ADULT - SUBJECTIVE AND OBJECTIVE BOX
HPI:  64F with hx of HLD, GERD, vertigo, osteoporosis, rheumatoid and osteoarthritis who presents for right retinal eye detachment surgery.  Symptoms started Thursday with a halo and then with eventual loss of some vision.  No eye pain or headaches.  No dizziness.  Went to see ophtho yesterday and was eventually diagnosed with retinal detachment.  Here for surgical repair.  Otherwise, patient said she just recovered from COVID over .  Tested negative for COVID however on Thursday.  States she is back at baseline.  No recent fevers, cough, nausea/vomiting, diarrhea.  No chest pain or SOB.  No abdominal pain.      PAST MEDICAL & SURGICAL HISTORY:  H/O hiatal hernia  GERD (gastroesophageal reflux disease)  H/O vertigo  Detached retina, right  H/O rheumatoid arthritis  H/O osteoporosis  Anemia  HLD (hyperlipidemia)  History of partial hysterectomy  History of tonsillectomy  History of cholecystectomy  History of torn meniscus of knee  History of   H/O inguinal hernia repair    REVIEW OF SYSTEMS:  CONSTITUTIONAL:    no fever or weight loss or fatigue  EYES:    right visual changes  ENMT:     no difficulty hearing or tinnitus or vertigo, no sinus or throat pain  NECK:    no pain or stiffness  RESPIRATORY:    no cough or wheezing or chills or hemoptysis, no shortness of breath  CARDIOVASCULAR:    no chest pain or palpitations or dizziness or leg swelling  GASTROINTESTINAL:    no abdominal or epigastric pain. no nausea or vomiting or hematemesis, no diarrhea or constipation. no melena or hematochezia.  GENITOURINARY:    no dysuria or frequency or hematuria or incontinence  NEUROLOGICAL:    no headaches or memory loss or loss of strength or numbness or tremors  SKIN:    no itching or burning or rashes or lesions   LYMPH NODES:    no enlarged glands  ENDOCRINE:    no heat or cold intolerance, no hair loss, no polydipsia or polyuria  MUSCULOSKELETAL:    no joint pain or swelling, no muscle or back or extremity pain  PSYCHIATRIC:    no depression or anxiety or mood swings or difficulty sleeping  HEME/LYMPH:    no easy bruising or bleeding gums  ALLERGY AND IMMUNOLOGIC:    no hives or eczema    HOME MEDICATIONS:    Home Medications:  B12 shot monthly :  (2025 06:26)  Evenity shot monthly:  (2025 06:26)  levocetirizine 5 mg oral tablet: 1 tab(s) orally once a day (at bedtime) (2025 06:26)  omeprazole 40 mg oral delayed release capsule: 1 cap(s) orally once a day (2025 06:26)  rosuvastatin 10 mg oral tablet: 1 tab(s) orally once a day (2025 06:26)    ALLERGIES and INTOLERANCES:  Ceclor (Unknown)  penicillin (Short breath; Hives)  Keflex (Short breath; Hives)    SOCIAL HISTORY:  - no smoking or etoh use  - retired    FAMILY HISTORY:  M - heart arrhythmias  F - cancers (lung, esophageal, prostate)    PHYSICAL EXAM:  Vital Signs Last 24 Hrs  T(C): 36.6 (2025 07:07), Max: 36.7 (2025 05:05)  T(F): 97.9 (2025 07:07), Max: 98 (2025 05:05)  HR: 80 (2025 07:07) (79 - 80)  BP: 107/77 (2025 07:07) (107/77 - 112/78)  BP(mean): --  RR: 18 (2025 07:07) (15 - 18)  SpO2: 99% (2025 07:07) (97% - 99%)    Parameters below as of 2025 05:05  Patient On (Oxygen Delivery Method): room air    GENERAL:     age appropriate, in NAD  HEAD:     atraumatic, normocephalic  EYES:     EOMI, conjunctiva and sclera clear  RESPIRATORY:     clear to auscultation bilaterally, no rales or rhonchi or wheezing or rubs  CARDIOVASCULAR:     regular rate and rhythm, no murmurs or rubs or gallops  GASTROINTESTINAL:     soft, nontender, nondistended, bowel sounds present  EXTREMITIES:     no clubbing or cyanosis or edema  MUSCULOSKELETAL:     no joint pain or swelling or deformities  NERVOUS SYSTEM:     motor strength intact with 5/5 B/L upper and lower extremities, no gross sensory deficits  PSYCH:     appropriate, alert and orientated x3, good concentration      LABS:                        12.5   5.13  )-----------( 166      ( 2025 05:19 )             37.3     2025 05:19    140    |  105    |  12     ----------------------------<  98     4.1     |  32[H]  |  0.71         Ca    9.1        2025 05:19    TPro  6.6    /  Alb  3.8    /  TBili  0.5    /  DBili  x      /  AST  22     /  ALT  26     /  AlkPhos  134[H]  2025 05:19    LIVER FUNCTIONS - ( 2025 05:19 )  Alb: 3.8 g/dL / Pro: 6.6 g/dL / ALK PHOS: 134 U/L / ALT: 26 U/L / AST: 22 U/L / GGT: x           PT/INR - ( 2025 05:19 )   PT: 11.8 ;   INR: 1.00          PTT - ( 2025 05:19 )  PTT:29.0     Urinalysis Basic - ( 2025 05:19 )    Color: x / Appearance: x / SG: x / pH: x  Gluc: 98 mg/dL / Ketone: x  / Bili: x / Urobili: x   Blood: x / Protein: x / Nitrite: x   Leuk Esterase: x / RBC: x / WBC x   Sq Epi: x / Non Sq Epi: x / Bacteria: x    EKG:  NSR with LAFB

## 2025-04-30 NOTE — ED ADULT TRIAGE NOTE - CHIEF COMPLAINT QUOTE
"I'm here for surgery" pt was seen by ophthalmology yesterday and was told she needed surgery; detached retina right eye

## 2025-04-30 NOTE — ASU DISCHARGE PLAN (ADULT/PEDIATRIC) - FINANCIAL ASSISTANCE
[facility] provides services at a reduced cost to those who are determined to be eligible through [facility]’s financial assistance program. Information regarding [facility]’s financial assistance program can be found by going to [email or web address] or by calling [phone].

## 2025-04-30 NOTE — ASU PATIENT PROFILE, ADULT - NSICDXPASTMEDICALHX_GEN_ALL_CORE_FT
PAST MEDICAL HISTORY:  Anemia     Detached retina, right     GERD (gastroesophageal reflux disease)     H/O hiatal hernia     H/O osteoporosis     H/O rheumatoid arthritis     H/O vertigo     HLD (hyperlipidemia)

## 2025-04-30 NOTE — ED PROVIDER NOTE - CLINICAL SUMMARY MEDICAL DECISION MAKING FREE TEXT BOX
64-year-old female hyperlipidemia here for a vitrectomy going to be performed by Dr. Fuentes Tavarez this morning.  Patient states on Friday she started having lower half of the her right eye visual loss.  Seen by the ophthalmologist yesterday diagnosed with retinal detachment and was told to come into the ER this morning for surgery.  Denies any pain.  Denies any fever or chills.  No anticoagulation use.    here for retinal detachment surgery/vitrectomy

## 2025-04-30 NOTE — ED PROVIDER NOTE - OBJECTIVE STATEMENT
64-year-old female hyperlipidemia here for a vitrectomy going to be performed by Dr. Fuentes Tavarez this morning.  Patient states on Friday she started having lower half of the her right eye visual loss.  Seen by the ophthalmologist yesterday diagnosed with retinal detachment and was told to come into the ER this morning for surgery.  Denies any pain.  Denies any fever or chills.  No anticoagulation use.

## 2025-04-30 NOTE — ED ADULT NURSE NOTE - NSICDXPASTMEDICALHX_GEN_ALL_CORE_FT
PAST MEDICAL HISTORY:  Detached retina, right     GERD (gastroesophageal reflux disease)     H/O hiatal hernia     H/O vertigo     High cholesterol

## 2025-04-30 NOTE — ED ADULT NURSE NOTE - NS ED PATIENT SAFETY CONCERN
Swimmer's Ear: Care Instructions  Your Care Instructions    Swimmer's ear (otitis externa) is inflammation or infection of the ear canal. This is the passage that leads from the outer ear to the eardrum. Any water, sand, or other debris that gets into the ear canal and stays there can cause swimmer's ear. Putting cotton swabs or other items in the ear to clean it can also cause this problem. Swimmer's ear can be very painful. But you can treat the pain and infection with medicines. You should feel better in a few days. Follow-up care is a key part of your treatment and safety. Be sure to make and go to all appointments, and call your doctor if you are having problems. It's also a good idea to know your test results and keep a list of the medicines you take. How can you care for yourself at home? Cleaning and care  · Use antibiotic drops as your doctor directs. · Do not insert ear drops (other than the antibiotic ear drops) or anything else into the ear unless your doctor has told you to. · Avoid getting water in the ear until the problem clears up. Use cotton lightly coated with petroleum jelly as an earplug. Do not use plastic earplugs. · Use a hair dryer set on low to carefully dry the ear after you shower. · To ease ear pain, hold a warm washcloth against your ear. · Take pain medicines exactly as directed. ¨ If the doctor gave you a prescription medicine for pain, take it as prescribed. ¨ If you are not taking a prescription pain medicine, ask your doctor if you can take an over-the-counter medicine. Inserting ear drops  · Warm the drops to body temperature by rolling the container in your hands. Or you can place it in a cup of warm water for a few minutes. · Lie down, with your ear facing up. · Place drops inside the ear. Follow your doctor's instructions (or the directions on the label) for how many drops to use.  Gently wiggle the outer ear or pull the ear up and back to help the drops get into the ear. · It's important to keep the liquid in the ear canal for 3 to 5 minutes. When should you call for help? Call your doctor now or seek immediate medical care if:  · You have a new or higher fever. · You have new or worse pain, swelling, warmth, or redness around or behind your ear. · You have new or increasing pus or blood draining from your ear. Watch closely for changes in your health, and be sure to contact your doctor if:  · You are not getting better after 2 days (48 hours). Where can you learn more? Go to http://ajit-david.info/. Enter C706 in the search box to learn more about \"Swimmer's Ear: Care Instructions. \"  Current as of: July 29, 2016  Content Version: 11.2  © 5680-4655 [employer]. Care instructions adapted under license by [employer] (which disclaims liability or warranty for this information). If you have questions about a medical condition or this instruction, always ask your healthcare professional. [place] [address] any warranty or liability for your use of this information. Swimmer's Ear: Care Instructions  Your Care Instructions    Swimmer's ear (otitis externa) is inflammation or infection of the ear canal. This is the passage that leads from the outer ear to the eardrum. Any water, sand, or other debris that gets into the ear canal and stays there can cause swimmer's ear. Putting cotton swabs or other items in the ear to clean it can also cause this problem. Swimmer's ear can be very painful. But you can treat the pain and infection with medicines. You should feel better in a few days. Follow-up care is a key part of your treatment and safety. Be sure to make and go to all appointments, and call your doctor if you are having problems. It's also a good idea to know your test results and keep a list of the medicines you take. How can you care for yourself at home?   Cleaning and care  · Use antibiotic drops as your doctor directs. · Do not insert ear drops (other than the antibiotic ear drops) or anything else into the ear unless your doctor has told you to. · Avoid getting water in the ear until the problem clears up. Use cotton lightly coated with petroleum jelly as an earplug. Do not use plastic earplugs. · Use a hair dryer set on low to carefully dry the ear after you shower. · To ease ear pain, hold a warm washcloth against your ear. · Take pain medicines exactly as directed. ¨ If the doctor gave you a prescription medicine for pain, take it as prescribed. ¨ If you are not taking a prescription pain medicine, ask your doctor if you can take an over-the-counter medicine. Inserting ear drops  · Warm the drops to body temperature by rolling the container in your hands. Or you can place it in a cup of warm water for a few minutes. · Lie down, with your ear facing up. · Place drops inside the ear. Follow your doctor's instructions (or the directions on the label) for how many drops to use. Gently wiggle the outer ear or pull the ear up and back to help the drops get into the ear. · It's important to keep the liquid in the ear canal for 3 to 5 minutes. When should you call for help? Call your doctor now or seek immediate medical care if:  · You have a new or higher fever. · You have new or worse pain, swelling, warmth, or redness around or behind your ear. · You have new or increasing pus or blood draining from your ear. Watch closely for changes in your health, and be sure to contact your doctor if:  · You are not getting better after 2 days (48 hours). Where can you learn more? Go to http://ajit-david.info/. Enter C706 in the search box to learn more about \"Swimmer's Ear: Care Instructions. \"  Current as of: July 29, 2016  Content Version: 11.2  © 5270-1149 [employer].  Care instructions adapted under license by [employer] (which disclaims liability No or warranty for this information). If you have questions about a medical condition or this instruction, always ask your healthcare professional. [place] [address] any warranty or liability for your use of this information.

## 2025-04-30 NOTE — ASU DISCHARGE PLAN (ADULT/PEDIATRIC) - ASU DC SPECIAL INSTRUCTIONSFT
Please maintain FACE DOWN position as much as possible with short breaks for bathroom and meals.  Please keep eye shield on until seen in the office.   You may resume your medication regimen as usual.   Please follow up with your Doctors office for your appointment tomorrow.

## 2025-04-30 NOTE — ASU PATIENT PROFILE, ADULT - PATIENT'S GENDER IDENTITY
Chelsi,     Please find your home exercise program below. You can perform this exercises once a day, as long as they do not reproduce or worsen your symptoms. Feel free to call me at [phone] if you have any questions.         Home Exercise Program:     1. Calf / Gastoc: Runners' Stretch I        One leg back and straight, other forward and bent supporting weight, lean forward, gently stretching calf of back leg. Hold __30__ seconds. Repeat with other leg.  Repeat __3__ times. Do _1___ sessions per day.      2. Calf / Soleus: Runners' Stretch II        One leg back and bent slightly, other forward and bent supporting weight, lean forward gently stretching calf of back leg. Hold _30_ seconds. Repeat with other leg.  Repeat _3___ times. Do _1___ sessions per day.    Copyright © [employer]. All rights reserved.     3. Fischers    - Start with foot pointed down towards ground and toes straight  - Curls toes and bring foot up towards ceiling   - Extend toes straight and begin to point foot towards ground to starting position   **perform 10 sets once per day       Female

## 2025-04-30 NOTE — ASU PATIENT PROFILE, ADULT - FALL HARM RISK - HARM RISK INTERVENTIONS

## 2025-04-30 NOTE — ASU PATIENT PROFILE, ADULT - NSICDXPASTSURGICALHX_GEN_ALL_CORE_FT
PAST SURGICAL HISTORY:  H/O inguinal hernia repair     History of      History of cholecystectomy     History of partial hysterectomy     History of tonsillectomy     History of torn meniscus of knee

## 2025-04-30 NOTE — ED ADULT NURSE NOTE - OBJECTIVE STATEMENT
pt to ED reports she is here for surgery. reports last Thursday she developed loss of the lower visual field of her right eye. denies trauma. denies pain. reports she has been NPO since 11pm last night. reports she was diagnoses with retinal detachment

## 2025-04-30 NOTE — ED ADULT NURSE NOTE - CHPI ED NUR SYMPTOMS NEG
loss of lower vision field right eye/no blurred vision/no discharge/no double vision/no drainage/no eye lid swelling/no foreign body/no itching/no pain/no photophobia/no purulent drainage

## 2025-05-01 ENCOUNTER — OFFICE (OUTPATIENT)
Dept: URBAN - METROPOLITAN AREA CLINIC 115 | Facility: CLINIC | Age: 65
Setting detail: OPHTHALMOLOGY
End: 2025-05-01
Payer: MEDICARE

## 2025-05-01 DIAGNOSIS — H33.8: ICD-10-CM

## 2025-05-01 PROBLEM — H33.311 HORSESHOE TEAR WITHOUT DETACHMENT; RIGHT EYE: Status: ACTIVE | Noted: 2025-04-29

## 2025-05-01 PROBLEM — H35.413 LATTICE DEGENERATION ; BOTH EYES: Status: ACTIVE | Noted: 2025-04-29

## 2025-05-01 PROCEDURE — 99024 POSTOP FOLLOW-UP VISIT: CPT | Performed by: OPHTHALMOLOGY

## 2025-05-01 ASSESSMENT — TONOMETRY: OS_IOP_MMHG: 15

## 2025-05-01 ASSESSMENT — REFRACTION_AUTOREFRACTION
OS_SPHERE: +1.00
OS_CYLINDER: -0.50
OD_AXIS: 058
OS_AXIS: 139
OD_SPHERE: +0.75
OD_CYLINDER: -0.75

## 2025-05-01 ASSESSMENT — CONFRONTATIONAL VISUAL FIELD TEST (CVF)
OD_FINDINGS: FULL
OS_FINDINGS: FULL

## 2025-05-01 ASSESSMENT — LID POSITION - DERMATOCHALASIS
OD_DERMATOCHALASIS: RUL 2+
OS_DERMATOCHALASIS: LUL 2+

## 2025-05-01 ASSESSMENT — KERATOMETRY
OD_K1POWER_DIOPTERS: 41.00
OD_AXISANGLE_DEGREES: 138
OS_K2POWER_DIOPTERS: 41.75
OD_K2POWER_DIOPTERS: 41.75
OS_K1POWER_DIOPTERS: 41.50
OS_AXISANGLE_DEGREES: 064
METHOD_AUTO_MANUAL: AUTO

## 2025-05-01 ASSESSMENT — VISUAL ACUITY
OD_BCVA: 20/20-
OS_BCVA: 20/HM

## 2025-05-06 RX ORDER — ROSUVASTATIN CALCIUM 20 MG/1
1 TABLET, FILM COATED ORAL
Refills: 0 | DISCHARGE

## 2025-05-06 RX ORDER — LEVOCETIRIZINE DIHYDROCHLORIDE 5 MG/1
0 TABLET ORAL
Refills: 0 | DISCHARGE

## 2025-05-06 RX ORDER — LEVOCETIRIZINE DIHYDROCHLORIDE 5 MG/1
1 TABLET ORAL
Refills: 0 | DISCHARGE

## 2025-05-06 RX ORDER — OMEPRAZOLE 20 MG/1
1 CAPSULE, DELAYED RELEASE ORAL
Refills: 0 | DISCHARGE

## 2025-05-07 ENCOUNTER — OFFICE (OUTPATIENT)
Dept: URBAN - METROPOLITAN AREA CLINIC 88 | Facility: CLINIC | Age: 65
Setting detail: OPHTHALMOLOGY
End: 2025-05-07
Payer: MEDICARE

## 2025-05-07 DIAGNOSIS — H33.8: ICD-10-CM

## 2025-05-07 PROCEDURE — 99024 POSTOP FOLLOW-UP VISIT: CPT | Performed by: OPHTHALMOLOGY

## 2025-05-07 ASSESSMENT — REFRACTION_AUTOREFRACTION
OD_CYLINDER: -0.75
OS_AXIS: 139
OS_CYLINDER: -0.50
OS_SPHERE: +1.00
OD_SPHERE: +0.75
OD_AXIS: 058

## 2025-05-07 ASSESSMENT — KERATOMETRY
OS_K1POWER_DIOPTERS: 41.50
METHOD_AUTO_MANUAL: AUTO
OD_K2POWER_DIOPTERS: 41.75
OS_AXISANGLE_DEGREES: 064
OS_K2POWER_DIOPTERS: 41.75
OD_K1POWER_DIOPTERS: 41.00
OD_AXISANGLE_DEGREES: 138

## 2025-05-07 ASSESSMENT — CONFRONTATIONAL VISUAL FIELD TEST (CVF)
OS_FINDINGS: FULL
OD_FINDINGS: FULL

## 2025-05-07 ASSESSMENT — TONOMETRY
OS_IOP_MMHG: 15
OD_IOP_MMHG: 15

## 2025-05-07 ASSESSMENT — VISUAL ACUITY
OS_BCVA: 20/HM
OD_BCVA: 20/25

## 2025-05-07 ASSESSMENT — LID POSITION - DERMATOCHALASIS
OS_DERMATOCHALASIS: LUL 2+
OD_DERMATOCHALASIS: RUL 2+

## 2025-05-22 ENCOUNTER — OFFICE (OUTPATIENT)
Dept: URBAN - METROPOLITAN AREA CLINIC 115 | Facility: CLINIC | Age: 65
Setting detail: OPHTHALMOLOGY
End: 2025-05-22
Payer: MEDICARE

## 2025-05-22 DIAGNOSIS — H33.8: ICD-10-CM

## 2025-05-22 PROCEDURE — 99024 POSTOP FOLLOW-UP VISIT: CPT | Performed by: OPHTHALMOLOGY

## 2025-05-22 ASSESSMENT — REFRACTION_AUTOREFRACTION
OS_AXIS: 139
OS_CYLINDER: -0.50
OS_SPHERE: +1.00
OD_SPHERE: +0.75
OD_AXIS: 058
OD_CYLINDER: -0.75

## 2025-05-22 ASSESSMENT — VISUAL ACUITY
OS_BCVA: 20/HM
OD_BCVA: 20/40

## 2025-05-22 ASSESSMENT — KERATOMETRY
OD_AXISANGLE_DEGREES: 138
OD_K2POWER_DIOPTERS: 41.75
OD_K1POWER_DIOPTERS: 41.00
OS_K1POWER_DIOPTERS: 41.50
METHOD_AUTO_MANUAL: AUTO
OS_K2POWER_DIOPTERS: 41.75
OS_AXISANGLE_DEGREES: 064

## 2025-05-22 ASSESSMENT — CONFRONTATIONAL VISUAL FIELD TEST (CVF)
OD_FINDINGS: FULL
OS_FINDINGS: FULL

## 2025-05-22 ASSESSMENT — LID POSITION - DERMATOCHALASIS
OS_DERMATOCHALASIS: LUL 2+
OD_DERMATOCHALASIS: RUL 2+

## 2025-06-04 ENCOUNTER — OFFICE (OUTPATIENT)
Dept: URBAN - METROPOLITAN AREA CLINIC 88 | Facility: CLINIC | Age: 65
Setting detail: OPHTHALMOLOGY
End: 2025-06-04
Payer: MEDICARE

## 2025-06-04 DIAGNOSIS — H35.412: ICD-10-CM

## 2025-06-04 PROCEDURE — 67145 PROPH RTA DTCHMNT PC: CPT | Mod: 79,LT | Performed by: OPHTHALMOLOGY

## 2025-06-04 ASSESSMENT — TONOMETRY
OS_IOP_MMHG: 14
OD_IOP_MMHG: 15

## 2025-06-04 ASSESSMENT — KERATOMETRY
OS_K2POWER_DIOPTERS: 41.75
OS_K1POWER_DIOPTERS: 41.50
OS_AXISANGLE_DEGREES: 064
OD_AXISANGLE_DEGREES: 138
OD_K1POWER_DIOPTERS: 41.00
METHOD_AUTO_MANUAL: AUTO
OD_K2POWER_DIOPTERS: 41.75

## 2025-06-04 ASSESSMENT — CONFRONTATIONAL VISUAL FIELD TEST (CVF)
OD_FINDINGS: FULL
OS_FINDINGS: FULL

## 2025-06-04 ASSESSMENT — REFRACTION_AUTOREFRACTION
OD_CYLINDER: -0.75
OS_SPHERE: +1.00
OD_SPHERE: +0.75
OS_CYLINDER: -0.50
OD_AXIS: 058
OS_AXIS: 139

## 2025-06-04 ASSESSMENT — LID POSITION - DERMATOCHALASIS
OD_DERMATOCHALASIS: RUL 2+
OS_DERMATOCHALASIS: LUL 2+

## 2025-06-04 ASSESSMENT — VISUAL ACUITY
OS_BCVA: 20/250
OD_BCVA: 20/30

## 2025-06-30 ENCOUNTER — OFFICE (OUTPATIENT)
Dept: URBAN - METROPOLITAN AREA CLINIC 115 | Facility: CLINIC | Age: 65
Setting detail: OPHTHALMOLOGY
End: 2025-06-30
Payer: MEDICARE

## 2025-06-30 DIAGNOSIS — H33.8: ICD-10-CM

## 2025-06-30 DIAGNOSIS — H35.411: ICD-10-CM

## 2025-06-30 DIAGNOSIS — H35.413: ICD-10-CM

## 2025-06-30 DIAGNOSIS — H35.412: ICD-10-CM

## 2025-06-30 PROCEDURE — 99024 POSTOP FOLLOW-UP VISIT: CPT | Performed by: OPHTHALMOLOGY

## 2025-06-30 ASSESSMENT — CONFRONTATIONAL VISUAL FIELD TEST (CVF)
OD_FINDINGS: FULL
OS_FINDINGS: FULL

## 2025-06-30 ASSESSMENT — TONOMETRY
OD_IOP_MMHG: 14
OS_IOP_MMHG: 14

## 2025-06-30 ASSESSMENT — VISUAL ACUITY
OD_BCVA: 20/50-1
OS_BCVA: 20/400

## 2025-07-02 PROBLEM — H11.153 PINGUECULA; BOTH EYES: Status: ACTIVE | Noted: 2025-07-02

## 2025-08-04 ENCOUNTER — APPOINTMENT (OUTPATIENT)
Dept: ORTHOPEDIC SURGERY | Facility: CLINIC | Age: 65
End: 2025-08-04
Payer: MEDICARE

## 2025-08-04 DIAGNOSIS — M48.061 SPINAL STENOSIS, LUMBAR REGION WITHOUT NEUROGENIC CLAUDICATION: ICD-10-CM

## 2025-08-04 PROCEDURE — 99214 OFFICE O/P EST MOD 30 MIN: CPT

## (undated) DEVICE — DRAPE OPHTHALMIC W POUCH

## (undated) DEVICE — BACKFLUSH SOFT TIP 25G

## (undated) DEVICE — CANNULA ALCON SOFT TIP 25G

## (undated) DEVICE — ELCTR BIPOLAR CORD J&J 12FT DISP

## (undated) DEVICE — SUT VICRYL 7-0 12" TG140-8 DA

## (undated) DEVICE — SOL IRR BAL SALT + 500ML

## (undated) DEVICE — WARMING BLANKET LOWER ADULT

## (undated) DEVICE — CANNULA SURGICAL SPECIALTIES DUAL BORE 25G

## (undated) DEVICE — SCRAPER MEMBRANE 23G

## (undated) DEVICE — SOL BALANCE SALT 15ML

## (undated) DEVICE — PACK VITRECTOMY

## (undated) DEVICE — AUTO GAS FILL CONSTELLATION

## (undated) DEVICE — DRAPE MICROSCOPE RESIGHT

## (undated) DEVICE — GLV 7.5 PROTEXIS (WHITE)

## (undated) DEVICE — ILM FORCEP 25G

## (undated) DEVICE — CANNULA ALCON SOFT TIP 23G

## (undated) DEVICE — MARKER OPTH STR VISIMARK VIO

## (undated) DEVICE — VENODYNE/SCD SLEEVE CALF MEDIUM

## (undated) DEVICE — ELCTR BIPOLAR CORD BAUSCH & LOMB 12FT DISP

## (undated) DEVICE — DIATHERMY PROBE 25GA

## (undated) DEVICE — ILM FORCEP 23G

## (undated) DEVICE — CONSTELLATION TOTAL PLUS PAK 23G

## (undated) DEVICE — PACK CONSTELLATION POST 25G 20K

## (undated) DEVICE — FLEXLOOP CURVED SCRAPER MEMBRANE 25G